# Patient Record
Sex: FEMALE | Race: WHITE
[De-identification: names, ages, dates, MRNs, and addresses within clinical notes are randomized per-mention and may not be internally consistent; named-entity substitution may affect disease eponyms.]

---

## 2018-07-05 ENCOUNTER — HOSPITAL ENCOUNTER (EMERGENCY)
Dept: HOSPITAL 25 - UCEAST | Age: 68
Discharge: HOME | End: 2018-07-05
Payer: MEDICARE

## 2018-07-05 DIAGNOSIS — I10: ICD-10-CM
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
## 2018-07-05 DIAGNOSIS — S00.86XA: Primary | ICD-10-CM

## 2018-07-05 DIAGNOSIS — R21: ICD-10-CM
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
## 2018-07-05 PROCEDURE — 99202 OFFICE O/P NEW SF 15 MIN: CPT

## 2018-07-05 PROCEDURE — G0463 HOSPITAL OUTPT CLINIC VISIT: HCPCS

## 2018-07-05 NOTE — UC
Navneet DODGE Rebecca, scribed for Ranjith Ramos MD on 07/05/18 at 1310 .





Skin Complaint HPI





- HPI Summary


HPI Summary: 


Pt is a 68 y/o F who presents to [facility] c/o rash on the forehead. She noticed 

it Sunday afternoon (4 days ago), initially suspecting it to be an insect bite. 

Believed that she had a stinger or similar in the lesion when it first began. 

She has 2 lesions that are raised and erythematous to which she has applied 

triple antibiotic and Benadryl creama. At firt, they were slightly pruritic, 

though no longer. Associated pain is mild, ranked 2/10 and is not aggravated by 

palpation. Additionally notes mild right-sided scalp "tenderness." Received the 

old Shingles vaccination, but not the new one. 





- History of Current Complaint


Chief Complaint: [facility]


Time Seen by Provider: 07/05/18 13:04


Stated Complaint: SKIN ISSUE


Hx Obtained From: Patient


Onset/Duration: Lasting Days - 4 days, Still Present


Current Severity: Mild


Pain Intensity: 2


Pain Scale Used: 0-10 Numeric


Location: Face - Forehead


Character: Redness, Raised


Aggravating Factor(s): Nothing


Alleviating Factor(s): Nothing





- Allergy/Home Medications


Allergies/Adverse Reactions: 


 Allergies











Allergy/AdvReac Type Severity Reaction Status Date / Time


 


No Known Allergies Allergy   Verified 07/05/18 12:59











Home Medications: 


 Home Medications





Docusate CAP* [Colace Cap*] 100 mg PO DAILY 07/05/18 [History Confirmed 07/05/18

]


Lisinopril TAB* [Prinivil TAB 10 MG*] 10 mg PO DAILY 07/05/18 [History 

Confirmed 07/05/18]


Multivitamin [Multivitamins] 1 cap PO DAILY 07/05/18 [History Confirmed 07/05/18

]


Polyethylene Glycol 3350* [Miralax*] 17 gm PO DAILY PRN 07/05/18 [History 

Confirmed 07/05/18]


Pravastatin (NF) [Pravachol (NF)] 10 mg PO 1700 07/05/18 [History Confirmed 07/ 05/18]


diPHENhydraMINE 2% CREAM(NF) [Benadryl 2% CREAM (NF)] 1 applic TOPICAL DAILY 

PRN 07/05/18 [History Confirmed 07/05/18]











Review of Systems


Constitutional: Negative


Skin: Rash - Forehead, Other - R-sided scalp "tenderness"


Eyes: Negative


ENT: Negative


Respiratory: Negative


Cardiovascular: Negative


Gastrointestinal: Negative


Genitourinary: Negative


Motor: Negative


Neurovascular: Negative


Musculoskeletal: Negative


Neurological: Negative


Psychological: Negative


All Other Systems Reviewed And Are Negative: Yes





PMH/Surg Hx/FS Hx/Imm Hx





- Additional Past Medical History


Additional PMH: 


NEGATIVE PMHx: Bleeding disorders, DM, bleeding disorder


Cardiovascular History: Hypertension





- Surgical History


Surgical History: Yes


Surgery Procedure, Year, and Place: knee surgery.  lipoma removed from groin 

area 10 years ago





- Family History


Known Family History: 


   Negative: Blood Disorder





- Social History


Alcohol Use: Occasionally


Substance Use Type: None


Smoking Status (MU): Never Smoked Tobacco





Physical Exam





- Summary


Physical Exam Summary: 


Appearance: Well appearing, no pain distress


Skin: warm, dry, reflects adequate perfusion


Head/face: She has 2 irregular raised areas of erythema in the middle of the 

forehead with what looks like a central punctum and clear fluid that comes out, 

no lesions on the nose or scalp


Eyes: EOMI, LAMINE, no eye erythema


ENT: normal


Neck: supple, non-tender


Respiratory: CTA, breath sounds present


Cardiovascular: RRR, pulses symmetrical 


Musculoskeletal: normal, strength/ROM intact


Neuro: normal, sensory motor intact, A&Ox3





Triage Information Reviewed: Yes


Vital Signs: 


 Initial Vital Signs











Temp  98.5 F   07/05/18 12:54


 


Pulse  72   07/05/18 12:54


 


Resp  16   07/05/18 12:54


 


BP  129/90   07/05/18 12:54


 


Pulse Ox  99   07/05/18 12:54











Vital Signs Reviewed: Yes





Course/Dx





- Course


Course Of Treatment: Area developed after which she felt was an insect bite to 

the mid forehead.  There are 2 areas that look like they began as vesicular 

lesions now with erythema surrounding.  Clear fluid comes out.  One lesion 

appears to cross the midline making zoster less likely.  She has also been 

vaccinated.  I will use topical hydrocortisone sparingly, maybe one or 2 to 

times as well as a short course of Keflex and Valtrex given the off chance that 

it is zoster.





- Diagnoses


Provider Diagnoses: Insect bite with localized reaction





Discharge





- Sign-Out/Discharge


Documenting (check all that apply): Discharge/Admit/Transfer - Discharge





- Discharge Plan


Condition: Good


Disposition: HOME


Prescriptions: 


Cephalexin CAP* [Keflex CAP*] 500 mg PO TID #21 cap


ValACYclovir (*) [Valtrex 1 GM(*)] 1 gm PO TID #21 tab


Patient Education Materials:  Insect Bite or Sting (ED)


Referrals: 


Marjorie MCKAY,Jalen KIM [Primary Care Provider] - 


Additional Instructions: 


Do not use Benadryl or triple antibiotic ointment.





Apply a small amount of Hydrocortisone cream today and tomorrow.  Return if 

worse, new symptoms or other concerns.





- Billing Disposition and Condition


Condition: GOOD


Disposition: Home





The documentation as recorded by the Navneet alvarez Rebecca accurately 

reflects the service I personally performed and the decisions made by me, 

Ranjith Ramos MD.